# Patient Record
(demographics unavailable — no encounter records)

---

## 2019-10-03 NOTE — RAD
Exam:

XR Knee Lt 4 View STANDARD



HISTORY:

Left knee pain for one month. No history of trauma.



COMPARISON:

None



FINDINGS:



No acute fracture, dislocation, or other acute osseous abnormality is identified.



IMPRESSION:

No acute osseous abnormality is identified.



Reported By: Mike Khan 

Electronically Signed:  10/3/2019 11:40 AM